# Patient Record
Sex: MALE | Employment: FULL TIME | URBAN - METROPOLITAN AREA
[De-identification: names, ages, dates, MRNs, and addresses within clinical notes are randomized per-mention and may not be internally consistent; named-entity substitution may affect disease eponyms.]

---

## 2018-11-19 ENCOUNTER — HOSPITAL ENCOUNTER (OUTPATIENT)
Age: 43
Discharge: HOME | End: 2018-11-19
Attending: EMERGENCY MEDICINE
Payer: COMMERCIAL

## 2018-11-19 VITALS — SYSTOLIC BLOOD PRESSURE: 129 MMHG | HEART RATE: 79 BPM | DIASTOLIC BLOOD PRESSURE: 81 MMHG

## 2018-11-19 DIAGNOSIS — H81.10 BENIGN PAROXYSMAL POSITIONAL VERTIGO, UNSPECIFIED LATERALITY: Primary | ICD-10-CM

## 2018-11-19 PROCEDURE — 99204 OFFICE O/P NEW MOD 45 MIN: CPT

## 2018-11-19 PROCEDURE — 99213 OFFICE O/P EST LOW 20 MIN: CPT

## 2018-11-19 RX ORDER — MECLIZINE HCL 12.5 MG/1
25 TABLET ORAL ONCE
Status: COMPLETED | OUTPATIENT
Start: 2018-11-19 | End: 2018-11-19

## 2018-11-19 RX ORDER — MECLIZINE HYDROCHLORIDE 25 MG/1
25 TABLET ORAL 4 TIMES DAILY PRN
Qty: 20 TABLET | Refills: 0 | Status: SHIPPED | OUTPATIENT
Start: 2018-11-19

## 2018-11-19 RX ORDER — ALLOPURINOL 100 MG/1
100 TABLET ORAL DAILY
COMMUNITY

## 2018-11-19 NOTE — ED PROVIDER NOTES
Patient Seen in: [facility]    History   Patient presents with:  Dizziness (neurologic)    Stated Complaint: DIZZY    HPI    Patient is an otherwise healthy 49-year-old male who presents for evaluation of dizziness/lightheadedne are normal.   Musculoskeletal: Normal range of motion. Neurological: He is alert and oriented to person, place, and time. Cranial nerves II through XII intact. No nystagmus. Strength 5 out of 5 bilateral upper and lower extremities.    Skin: Skin is w

## 2018-11-19 NOTE — ED PROVIDER NOTES
Patient Seen in: [facility] In [facility]    History   Patient presents with:  Dizziness (neurologic)    Stated Complaint: DIZZY    HPI    63-year-old male with a history of gout and vertigo was initially seen by Dr. Jose Ibarra.   I have taken of Dizziness.   Qty: 20 tablet Refills: 0

## 2018-11-19 NOTE — ED INITIAL ASSESSMENT (HPI)
Complains of dizziness (room spinning) in the last two days. Had head or nose congestion or cold symptoms a week ago.

## 2019-09-24 ENCOUNTER — HOSPITAL ENCOUNTER (OUTPATIENT)
Age: 44
Discharge: HOME | End: 2019-09-24
Payer: COMMERCIAL

## 2019-09-24 VITALS
TEMPERATURE: 99 F | RESPIRATION RATE: 16 BRPM | HEART RATE: 58 BPM | DIASTOLIC BLOOD PRESSURE: 80 MMHG | OXYGEN SATURATION: 97 % | SYSTOLIC BLOOD PRESSURE: 111 MMHG

## 2019-09-24 DIAGNOSIS — L08.9 SKIN INFECTION: ICD-10-CM

## 2019-09-24 DIAGNOSIS — B35.4 TINEA CORPORIS: Primary | ICD-10-CM

## 2019-09-24 PROCEDURE — 99214 OFFICE O/P EST MOD 30 MIN: CPT

## 2019-09-24 PROCEDURE — 99213 OFFICE O/P EST LOW 20 MIN: CPT

## 2019-09-24 RX ORDER — CLOTRIMAZOLE AND BETAMETHASONE DIPROPIONATE 10; .64 MG/G; MG/G
1 CREAM TOPICAL 2 TIMES DAILY
Qty: 1 TUBE | Refills: 0 | Status: SHIPPED | OUTPATIENT
Start: 2019-09-24

## 2019-09-24 NOTE — ED PROVIDER NOTES
Patient Seen in: [facility] [place]      History   Patient presents with:  Rash    Stated Complaint: 1 week rash on rt forearm    HPI    39 yo male with complaint of 4 circular lesions with raised vesicular borders to his right forearm HENT:   Head: Normocephalic. Right Ear: External ear normal.   Left Ear: External ear normal.   Nose: Nose normal.   Mouth/Throat: Oropharynx is clear and moist.   Eyes: Pupils are equal, round, and reactive to light.  Conjunctivae and EOM are normal.   N Medications Prescribed:  Current Discharge Medication List    START taking these medications    mupirocin 2 % External Ointment  Apply 1 Application topically daily for 14 days.   Qty: 1 Tube Refills: 0    clotrimazole-betamethasone 1-0.05 % External Cream

## 2019-09-24 NOTE — ED INITIAL ASSESSMENT (HPI)
Right forearm- rash  X 1 week. Small blisters noted. Denies fever. Or used any new products. Pt applied  Hydrocortisone and also vicks las night.